# Patient Record
Sex: FEMALE | Race: WHITE | Employment: UNEMPLOYED | URBAN - METROPOLITAN AREA
[De-identification: names, ages, dates, MRNs, and addresses within clinical notes are randomized per-mention and may not be internally consistent; named-entity substitution may affect disease eponyms.]

---

## 2023-02-24 LAB — SARS-COV-2 RESULT: NOT DETECTED

## 2023-11-08 PROBLEM — F64.9 GENDER IDENTITY DISORDER, UNSPECIFIED: Status: ACTIVE | Noted: 2023-11-08

## 2023-11-08 RX ORDER — NICOTINE POLACRILEX 2 MG
GUM BUCCAL
COMMUNITY

## 2023-11-08 RX ORDER — DUTASTERIDE 0.5 MG/1
CAPSULE, LIQUID FILLED ORAL
COMMUNITY

## 2023-11-08 RX ORDER — PROGESTERONE 100 MG/1
200 CAPSULE ORAL NIGHTLY
COMMUNITY

## 2023-11-08 RX ORDER — ESTRADIOL VALERATE 40 MG/ML
0.15 INJECTION INTRAMUSCULAR
COMMUNITY

## 2023-11-08 RX ORDER — MULTIVITAMIN
TABLET ORAL
COMMUNITY

## 2023-11-08 RX ORDER — SPIRONOLACTONE 100 MG/1
TABLET, FILM COATED ORAL
COMMUNITY

## 2023-11-08 RX ORDER — ACETAMINOPHEN 325 MG/1
650 TABLET ORAL EVERY 4 HOURS PRN
COMMUNITY

## 2023-11-09 ENCOUNTER — TELEMEDICINE (OUTPATIENT)
Dept: UROLOGY | Facility: CLINIC | Age: 43
End: 2023-11-09
Payer: COMMERCIAL

## 2023-11-09 DIAGNOSIS — F64.9 GENDER DYSPHORIA: Primary | ICD-10-CM

## 2023-11-09 PROCEDURE — 99213 OFFICE O/P EST LOW 20 MIN: CPT | Performed by: NURSE PRACTITIONER

## 2023-11-09 NOTE — PROGRESS NOTES
GENDER CARE POST-OP     HISTORY OF PRESENT ILLNESS:   Kalyani Vences is a 43 y.o. trans woman s/p PPT vaginoplasty 2/24/23    An interactive audio and video telecommunication system which permits real time communications between the patient (at the originating site) and provider (at the distant site) was utilized to provide this telehealth service.   Verbal consent was requested and obtained from Kalyani Vences on this date, 03/22/24 for a telehealth visit.     INTERVAL HISTORY:  Seen today for FUV  Reports doing well  No issues with dilation  Planning on move to [place], career change    PAST MEDICAL HISTORY:  No past medical history on file.    PAST SURGICAL HISTORY:  Past Surgical History:   Procedure Laterality Date    OTHER SURGICAL HISTORY  04/18/2022    Appendectomy    OTHER SURGICAL HISTORY  04/18/2022    Hair transplantation        ALLERGIES:   No Known Allergies     MEDICATIONS:     Current Outpatient Medications:     acetaminophen (Tylenol) 325 mg tablet, Take 2 tablets (650 mg) by mouth every 4 hours if needed., Disp: , Rfl:     biotin 1 mg capsule, Biotin 1 MG Oral Capsule  Refills: 0     Active, Disp: , Rfl:     dutasteride (Avodart) 0.5 mg capsule, Dutasteride 0.5 MG Oral Capsule  Refills: 0     Active, Disp: , Rfl:     estradiol valerate (Delestrogen) 40 mg/mL injection, Inject 0.15 mL (6 mg) into the muscle. Every 4 days, Disp: , Rfl:     multivitamin tablet, Multivitamins TABS  Refills: 0     Active, Disp: , Rfl:     progesterone (Prometrium) 100 mg capsule, Take 2 capsules (200 mg) by mouth once daily at bedtime., Disp: , Rfl:     spironolactone (Aldactone) 100 mg tablet, Spironolactone 100 MG Oral Tablet  Refills: 0     Active, Disp: , Rfl:     surgical lubricant gel, Apply 10 Applications topically 3 times a day., Disp: , Rfl:      SOCIAL HISTORY:  Patient     Social History     Socioeconomic History    Marital status: Single     Spouse name: Not on file    Number of children: Not on file    Years  of education: Not on file    Highest education level: Not on file   Occupational History    Not on file   Tobacco Use    Smoking status: Not on file    Smokeless tobacco: Not on file   Substance and Sexual Activity    Alcohol use: Not on file    Drug use: Not on file    Sexual activity: Not on file   Other Topics Concern    Not on file   Social History Narrative    Not on file     Social Determinants of Health     Financial Resource Strain: Not on file   Food Insecurity: Not on file   Transportation Needs: Not on file   Physical Activity: Not on file   Stress: Not on file   Social Connections: Not on file   Intimate Partner Violence: Not on file   Housing Stability: Not on file       FAMILY HISTORY:  No family history on file.    REVIEW OF SYSTEMS:  All systems reviewed, pertinent negatives as noted in HPI.     PHYSICAL EXAM:  There were no vitals taken for this visit.  Constitutional: Well-developed and well-nourished. No distress.    Head: Normocephalic and atraumatic.    Neck: Normal range of motion.    Pulmonary/Chest: Effort normal. No respiratory distress.   Musculoskeletal: Normal range of motion.    Neurological: Alert and oriented.  Psychiatric: Normal mood and affect. Thought content normal.      LABORATORY REVIEW:   Lab Results   Component Value Date    BUN 7 02/27/2023    CREATININE 0.73 02/27/2023     02/27/2023    K 4.3 02/27/2023     02/27/2023    CO2 31 02/27/2023    CALCIUM 8.5 (L) 02/27/2023      Lab Results   Component Value Date    WBC 8.3 02/27/2023    RBC 3.07 (L) 02/27/2023    HGB 9.9 (L) 02/27/2023    HCT 27.7 (L) 02/27/2023    MCV 90 02/27/2023    MCHC 35.7 02/27/2023    RDW 12.0 02/27/2023     02/27/2023               Assessment:     Encounter Diagnosis   Name Primary?    Gender dysphoria Yes       Kalyanimelinda Vences is a 43 y.o. trans woman s/p PPT vaginoplasty 2/24/23  Moving to [place] in the new year, will have good access to supportive/affirming care     Plan:   Continue  gabbie and doblayne   Recommend annual GYN exam  Will need annual PSA screening beginning at 52  RTC prn, we are happy to remain available for any additional support  Encouraged to call in the interim with any questions, concerns

## 2024-02-12 ENCOUNTER — CLINICAL SUPPORT (OUTPATIENT)
Dept: UROLOGY | Facility: CLINIC | Age: 44
End: 2024-02-12
Payer: COMMERCIAL

## 2024-02-12 DIAGNOSIS — Z00.6 RESEARCH STUDY PATIENT: ICD-10-CM

## 2024-02-12 LAB
HIV 1+2 AB+HIV1 P24 AG SERPL QL IA: NONREACTIVE
TREPONEMA PALLIDUM IGG+IGM AB [PRESENCE] IN SERUM OR PLASMA BY IMMUNOASSAY: NONREACTIVE

## 2024-02-12 PROCEDURE — 88112 CYTOPATH CELL ENHANCE TECH: CPT

## 2024-02-12 PROCEDURE — 88141 CYTOPATH C/V INTERPRET: CPT | Performed by: PATHOLOGY

## 2024-02-12 PROCEDURE — 36415 COLL VENOUS BLD VENIPUNCTURE: CPT

## 2024-02-12 PROCEDURE — 88112 CYTOPATH CELL ENHANCE TECH: CPT | Performed by: PATHOLOGY

## 2024-02-12 PROCEDURE — 87591 N.GONORRHOEAE DNA AMP PROB: CPT

## 2024-02-12 PROCEDURE — 87491 CHLMYD TRACH DNA AMP PROBE: CPT

## 2024-02-12 PROCEDURE — 88142 CYTOPATH C/V THIN LAYER: CPT

## 2024-02-12 PROCEDURE — 87389 HIV-1 AG W/HIV-1&-2 AB AG IA: CPT

## 2024-02-12 PROCEDURE — 87624 HPV HI-RISK TYP POOLED RSLT: CPT

## 2024-02-12 PROCEDURE — 86780 TREPONEMA PALLIDUM: CPT

## 2024-02-13 LAB
C TRACH RRNA SPEC QL NAA+PROBE: NEGATIVE
LABORATORY COMMENT REPORT: NORMAL
N GONORRHOEA DNA SPEC QL PROBE+SIG AMP: NEGATIVE
PATH REPORT.FINAL DX SPEC: NORMAL
PATH REPORT.GROSS SPEC: NORMAL
PATH REPORT.RELEVANT HX SPEC: NORMAL
PATH REPORT.TOTAL CANCER: NORMAL
RESIDENT REVIEW: NORMAL

## 2024-02-26 LAB
CYTOLOGY CMNT CVX/VAG CYTO-IMP: NORMAL
HPV HR 12 DNA GENITAL QL NAA+PROBE: NEGATIVE
HPV HR GENOTYPES PNL CVX NAA+PROBE: NEGATIVE
HPV16 DNA SPEC QL NAA+PROBE: NEGATIVE
HPV18 DNA SPEC QL NAA+PROBE: NEGATIVE
LAB AP HPV GENOTYPE QUESTION: YES
LAB AP HPV HR: NORMAL
LAB AP PAP ADDITIONAL TESTS: NORMAL
LABORATORY COMMENT REPORT: NORMAL
LABORATORY COMMENT REPORT: NORMAL
PATH REPORT.TOTAL CANCER: NORMAL